# Patient Record
Sex: FEMALE | Race: WHITE | URBAN - METROPOLITAN AREA
[De-identification: names, ages, dates, MRNs, and addresses within clinical notes are randomized per-mention and may not be internally consistent; named-entity substitution may affect disease eponyms.]

---

## 2023-09-15 ENCOUNTER — OFFICE VISIT (OUTPATIENT)
Age: 48
End: 2023-09-15

## 2023-09-15 VITALS
HEIGHT: 63 IN | HEART RATE: 88 BPM | BODY MASS INDEX: 31.89 KG/M2 | SYSTOLIC BLOOD PRESSURE: 118 MMHG | TEMPERATURE: 98.5 F | OXYGEN SATURATION: 92 % | WEIGHT: 180 LBS | DIASTOLIC BLOOD PRESSURE: 75 MMHG

## 2023-09-15 DIAGNOSIS — R82.998 LEUKOCYTES IN URINE: ICD-10-CM

## 2023-09-15 DIAGNOSIS — N89.8 VAGINAL DISCHARGE: ICD-10-CM

## 2023-09-15 DIAGNOSIS — N89.8 VAGINAL ODOR: Primary | ICD-10-CM

## 2023-09-15 LAB
APPEARANCE FLUID: ABNORMAL
BILIRUBIN, POC: ABNORMAL
BLOOD URINE, POC: ABNORMAL
CLARITY, POC: ABNORMAL
COLOR, POC: ABNORMAL
GLUCOSE URINE, POC: ABNORMAL
KETONES, POC: ABNORMAL
LEUKOCYTE EST, POC: ABNORMAL
NITRITE, POC: ABNORMAL
PH, POC: 5.5
PROTEIN, POC: 30
SPECIFIC GRAVITY, POC: 1.03
UROBILINOGEN, POC: 0.2

## 2023-09-15 ASSESSMENT — ENCOUNTER SYMPTOMS
BACK PAIN: 0
ABDOMINAL PAIN: 0

## 2023-09-15 NOTE — PROGRESS NOTES
Forrest Lorenzo (:  1975) is a 57 y.o. female, Established patient, here for evaluation of the following chief complaint(s):  Urinary Tract Infection (Discharge and odor x week )    ASSESSMENT/PLAN:    ICD-10-CM    1. Vaginal odor  N89.8 POCT Urinalysis no Micro     VAGINAL PATHOGENS PROBE *A     C.trachomatis N.gonorrhoeae DNA, Urine ([facility] Only)     CANCELED: Trichomonas by EIA      2. Vaginal discharge  N89.8 POCT Urinalysis no Micro     VAGINAL PATHOGENS PROBE *A     C.trachomatis N.gonorrhoeae DNA, Urine ([facility] Only)      3. Leukocytes in urine  R82.998 Culture, Urine        POC testing: Discussed result(s) with patient  Results for POC orders placed in visit on 09/15/23   POCT Urinalysis no Micro   Result Value Ref Range    Color, UA dark yellow     Clarity, UA cloudy     Glucose, UA POC neg     Bilirubin, UA neg     Ketones, UA neg     Spec Grav, UA 1.030     Blood, UA POC trace-int     pH, UA 5.5     Protein, UA POC 30     Urobilinogen, UA 0.2     Leukocytes, UA mod     Nitrite, UA neg     Appearance, Fluid Cloudy (A) Clear, Slightly Cloudy     Ddx includes: STI, BV, vaginal yeast, UTI  Disposition: Pt presents for vaginal discharge and odor. VSS. Physical Exam as below. Ua positive for blood, leuks, and protein. Vaginal swab, urine cx, and GC urine pending. Will contact pt with results and treat accordingly. Abstain from sex pending results. Reviewed AVS with patient. All questions answered. If symptoms worsen go to the Emergency Department. Follow up in clinic or with PCP as needed. SUBJECTIVE/OBJECTIVE:  55yo F presents for vaginal discharge and odor x1.5 weeks. Unable to describe discharge. Denies any other symptoms. No recent antibiotics, douching, bubble baths, or soaps.      Hx Chlamydia      History provided by:  Patient   used: No      Vitals:    09/15/23 1548   BP: 118/75   Site: Right Upper Arm   Position: Sitting   Cuff Size: Large Adult   Pulse: 88

## 2023-09-18 LAB
C TRACH DNA UR QL NAA+PROBE: POSITIVE
CANDIDA DNA VAG QL NAA+PROBE: ABNORMAL
G VAGINALIS DNA SPEC QL NAA+PROBE: ABNORMAL
N GONORRHOEA DNA UR QL NAA+PROBE: POSITIVE
REASON FOR REJECTION: NORMAL
REASON FOR REJECTION: NORMAL
REJECTED TEST: NORMAL
REJECTED TEST: NORMAL
T VAGINALIS DNA VAG QL NAA+PROBE: ABNORMAL

## 2023-09-19 ENCOUNTER — OFFICE VISIT (OUTPATIENT)
Age: 48
End: 2023-09-19

## 2023-09-19 VITALS — BODY MASS INDEX: 33.13 KG/M2 | WEIGHT: 187 LBS

## 2023-09-19 DIAGNOSIS — A59.9 TRICHIMONIASIS: ICD-10-CM

## 2023-09-19 DIAGNOSIS — A54.9 GONORRHEA: Primary | ICD-10-CM

## 2023-09-19 DIAGNOSIS — Z11.3 ROUTINE SCREENING FOR STI (SEXUALLY TRANSMITTED INFECTION): ICD-10-CM

## 2023-09-19 RX ORDER — CEFTRIAXONE 1 G/1
1000 INJECTION, POWDER, FOR SOLUTION INTRAMUSCULAR; INTRAVENOUS ONCE
Status: COMPLETED | OUTPATIENT
Start: 2023-09-19 | End: 2023-09-19

## 2023-09-19 RX ORDER — METRONIDAZOLE 500 MG/1
2000 TABLET ORAL ONCE
Qty: 4 TABLET | Refills: 0 | Status: SHIPPED | OUTPATIENT
Start: 2023-09-19 | End: 2023-09-19

## 2023-09-19 RX ADMIN — CEFTRIAXONE 1000 MG: 1 INJECTION, POWDER, FOR SOLUTION INTRAMUSCULAR; INTRAVENOUS at 13:02

## 2023-09-19 NOTE — PROGRESS NOTES
Kathie Saini (:  1975) is a 57 y.o. female, Established patient, here for evaluation of the following chief complaint(s): Other (injection)    ASSESSMENT/PLAN:    ICD-10-CM    1. Gonorrhea  A54.9 cefTRIAXone (ROCEPHIN) injection 1,000 mg      2. Routine screening for STI (sexually transmitted infection)  Z11.3 Culture, Ureaplasma/Mycoplasma hominis     Culture, Urine        Error with specimen collection, will send urine cx out again. Will also add ureaplasma/mycoplasma d/t recent positives of GCT. Will contact pt and treat accordingly. She is to abstain from sex 7 days post treatment of STIs. An electronic signature was used to authenticate this note.     --Mauro Orellana PA-C

## 2023-09-20 LAB — BACTERIA UR CULT: NORMAL

## 2023-09-24 LAB
FINAL REPORT: NORMAL
PRELIMINARY: NORMAL

## 2023-12-02 ENCOUNTER — OFFICE VISIT (OUTPATIENT)
Age: 48
End: 2023-12-02

## 2023-12-02 VITALS
OXYGEN SATURATION: 97 % | DIASTOLIC BLOOD PRESSURE: 72 MMHG | TEMPERATURE: 98.6 F | SYSTOLIC BLOOD PRESSURE: 116 MMHG | HEART RATE: 77 BPM

## 2023-12-02 DIAGNOSIS — Z11.3 ROUTINE SCREENING FOR STI (SEXUALLY TRANSMITTED INFECTION): Primary | ICD-10-CM

## 2023-12-02 DIAGNOSIS — N32.89 BLADDER SPASMS: ICD-10-CM

## 2023-12-02 DIAGNOSIS — N89.8 VAGINAL DISCHARGE: ICD-10-CM

## 2023-12-02 LAB
APPEARANCE FLUID: ABNORMAL
BILIRUBIN, POC: NEGATIVE
BLOOD URINE, POC: NEGATIVE
CLARITY, POC: ABNORMAL
COLOR, POC: YELLOW
GLUCOSE URINE, POC: NEGATIVE
KETONES, POC: NEGATIVE
LEUKOCYTE EST, POC: NEGATIVE
NITRITE, POC: NEGATIVE
PH, POC: 7
PROTEIN, POC: NEGATIVE
SPECIFIC GRAVITY, POC: 1.02
UROBILINOGEN, POC: 0.2

## 2023-12-02 RX ORDER — PHENAZOPYRIDINE HYDROCHLORIDE 100 MG/1
100 TABLET, FILM COATED ORAL 3 TIMES DAILY PRN
Qty: 30 TABLET | Refills: 0 | Status: SHIPPED | OUTPATIENT
Start: 2023-12-02

## 2023-12-02 RX ORDER — CEFTRIAXONE 1 G/1
1000 INJECTION, POWDER, FOR SOLUTION INTRAMUSCULAR; INTRAVENOUS ONCE
Status: COMPLETED | OUTPATIENT
Start: 2023-12-02 | End: 2023-12-02

## 2023-12-02 RX ORDER — METRONIDAZOLE 500 MG/1
500 TABLET ORAL 2 TIMES DAILY
Qty: 14 TABLET | Refills: 0 | Status: SHIPPED | OUTPATIENT
Start: 2023-12-02 | End: 2023-12-09

## 2023-12-02 RX ORDER — DOXYCYCLINE HYCLATE 100 MG
100 TABLET ORAL 2 TIMES DAILY
Qty: 14 TABLET | Refills: 0 | Status: SHIPPED | OUTPATIENT
Start: 2023-12-02 | End: 2023-12-09

## 2023-12-02 RX ADMIN — CEFTRIAXONE 1000 MG: 1 INJECTION, POWDER, FOR SOLUTION INTRAMUSCULAR; INTRAVENOUS at 19:33

## 2023-12-02 ASSESSMENT — ENCOUNTER SYMPTOMS
ABDOMINAL PAIN: 0
BACK PAIN: 0

## 2023-12-02 NOTE — PROGRESS NOTES
Sherlyn George (:  1975) is a 57 y.o. female, Established patient, here for evaluation of the following chief complaint(s):  Vaginal Discharge and Vaginal Odor (Symptoms for a couple of weeks now.)    ASSESSMENT/PLAN:    ICD-10-CM    1. Routine screening for STI (sexually transmitted infection)  Z11.3 cefTRIAXone (ROCEPHIN) injection 1,000 mg     metroNIDAZOLE (FLAGYL) 500 MG tablet     doxycycline hyclate (VIBRA-TABS) 100 MG tablet      2. Vaginal discharge  N89.8 C.trachomatis N.gonorrhoeae DNA, Urine     Vaginal Pathogens Probes *A     cefTRIAXone (ROCEPHIN) injection 1,000 mg     metroNIDAZOLE (FLAGYL) 500 MG tablet     POCT Urinalysis no Micro     doxycycline hyclate (VIBRA-TABS) 100 MG tablet      3. Bladder spasms  N32.89 phenazopyridine (PYRIDIUM) 100 MG tablet        POC testing: Discussed result(s) with patient  Results for POC orders placed in visit on 23   POCT Urinalysis no Micro   Result Value Ref Range    Color, UA yellow     Clarity, UA cloudy     Glucose, UA POC negative     Bilirubin, UA negative     Ketones, UA negative     Spec Grav, UA 1.020     Blood, UA POC negative     pH, UA 7.0     Protein, UA POC negative     Urobilinogen, UA 0.2     Leukocytes, UA negative     Nitrite, UA negative     Appearance, Fluid Cloudy (A) Clear, Slightly Cloudy     Management Given: 1g Rocephin   Ddx includes: STI, BV, vaginal yeast, UTI  Disposition: PT presents for vaginal discharge and odor. VSS. Physical Exam normal. UA negative. GC urine and vaginal swab pending. She is to abstain from sex pending results or 7 days after treatment. Will treat for GCT and BV. Will contact PT with results. Education and handout provided on diagnosis and management of symptoms. AVS reviewed with patient. All questions answered. If symptoms worsen go to the Emergency Department. Follow up in clinic or with PCP as needed. Patient is agreeable with plan.      SUBJECTIVE/OBJECTIVE:  55yo F presents for vaginal

## 2023-12-03 LAB
CANDIDA DNA VAG QL NAA+PROBE: ABNORMAL
G VAGINALIS DNA SPEC QL NAA+PROBE: ABNORMAL
T VAGINALIS DNA VAG QL NAA+PROBE: ABNORMAL

## 2023-12-04 LAB
C TRACH DNA UR QL NAA+PROBE: NEGATIVE
N GONORRHOEA DNA UR QL NAA+PROBE: NEGATIVE

## 2024-03-09 ENCOUNTER — OFFICE VISIT (OUTPATIENT)
Age: 49
End: 2024-03-09

## 2024-03-09 VITALS
TEMPERATURE: 98.7 F | BODY MASS INDEX: 33.13 KG/M2 | SYSTOLIC BLOOD PRESSURE: 116 MMHG | WEIGHT: 187 LBS | HEART RATE: 81 BPM | OXYGEN SATURATION: 97 % | DIASTOLIC BLOOD PRESSURE: 72 MMHG

## 2024-03-09 DIAGNOSIS — K04.7 DENTAL ABSCESS: Primary | ICD-10-CM

## 2024-03-09 RX ORDER — CLINDAMYCIN HYDROCHLORIDE 300 MG/1
300 CAPSULE ORAL 3 TIMES DAILY
Qty: 30 CAPSULE | Refills: 0 | Status: SHIPPED | OUTPATIENT
Start: 2024-03-09 | End: 2024-03-09

## 2024-03-09 RX ORDER — PREDNISONE 20 MG/1
20 TABLET ORAL DAILY
Qty: 5 TABLET | Refills: 0 | Status: SHIPPED | OUTPATIENT
Start: 2024-03-09 | End: 2024-03-09

## 2024-03-09 RX ORDER — CLINDAMYCIN HYDROCHLORIDE 300 MG/1
300 CAPSULE ORAL 3 TIMES DAILY
Qty: 30 CAPSULE | Refills: 0 | Status: SHIPPED | OUTPATIENT
Start: 2024-03-09 | End: 2024-03-19

## 2024-03-09 RX ORDER — PREDNISONE 20 MG/1
20 TABLET ORAL DAILY
Qty: 5 TABLET | Refills: 0 | Status: SHIPPED | OUTPATIENT
Start: 2024-03-09 | End: 2024-03-14

## 2024-03-09 NOTE — PATIENT INSTRUCTIONS
Thank you for allowing us to care for you today and we hope you feel better soon  Tylenol/Motrin as needed for fever and discomfort  New Prescriptions    CLINDAMYCIN (CLEOCIN) 300 MG CAPSULE    Take 1 capsule by mouth 3 times daily for 10 days    PREDNISONE (DELTASONE) 20 MG TABLET    Take 1 tablet by mouth daily for 5 days

## 2024-03-09 NOTE — PROGRESS NOTES
Berenice Long (:  1975) is a 48 y.o. female,Established patient, here for evaluation of the following chief complaint(s):  Dental Pain (Symptoms since yesterday.)      ASSESSMENT/PLAN:    ICD-10-CM    1. Dental abscess  K04.7 clindamycin (CLEOCIN) 300 MG capsule     predniSONE (DELTASONE) 20 MG tablet          Dx Diff:Dental fracture, abscess   Education and handout provided on diagnosis and management of symptoms.   AVS reviewed with patient. Follow up as needed in UC or with PCP for new or worsening symptoms.   Return if symptoms worsen or fail to improve.    SUBJECTIVE/OBJECTIVE:  Patient presents today with complaints of right sided dental pain that started yesterday      History provided by:  Patient   used: No    Dental Pain         Vitals:    24 1347   BP: 116/72   Site: Left Upper Arm   Position: Sitting   Cuff Size: Large Adult   Pulse: 81   Temp: 98.7 °F (37.1 °C)   TempSrc: Oral   SpO2: 97%   Weight: 84.8 kg (187 lb)       Review of Systems    Physical Exam  Constitutional:       Appearance: Normal appearance.   HENT:      Head: Normocephalic.      Nose: Nose normal.      Mouth/Throat:      Mouth: Mucous membranes are moist.      Pharynx: Oropharynx is clear.        Comments: Tooth broken and black in color  Cardiovascular:      Rate and Rhythm: Normal rate and regular rhythm.      Heart sounds: Normal heart sounds.   Pulmonary:      Effort: Pulmonary effort is normal.      Breath sounds: Normal breath sounds.   Skin:     General: Skin is warm and dry.   Neurological:      Mental Status: She is alert and oriented to person, place, and time.   Psychiatric:         Mood and Affect: Mood normal.           An electronic signature was used to authenticate this note.    --Javad Kingsley, BRUNO - CNP